# Patient Record
Sex: FEMALE | Race: WHITE | HISPANIC OR LATINO | URBAN - METROPOLITAN AREA
[De-identification: names, ages, dates, MRNs, and addresses within clinical notes are randomized per-mention and may not be internally consistent; named-entity substitution may affect disease eponyms.]

---

## 2021-01-22 ENCOUNTER — EMERGENCY (EMERGENCY)
Facility: HOSPITAL | Age: 26
LOS: 1 days | Discharge: HOME | End: 2021-01-22
Attending: EMERGENCY MEDICINE | Admitting: EMERGENCY MEDICINE
Payer: COMMERCIAL

## 2021-01-22 VITALS
RESPIRATION RATE: 16 BRPM | DIASTOLIC BLOOD PRESSURE: 76 MMHG | TEMPERATURE: 98 F | HEART RATE: 87 BPM | SYSTOLIC BLOOD PRESSURE: 111 MMHG | OXYGEN SATURATION: 99 %

## 2021-01-22 DIAGNOSIS — Z20.822 CONTACT WITH AND (SUSPECTED) EXPOSURE TO COVID-19: ICD-10-CM

## 2021-01-22 PROCEDURE — 99283 EMERGENCY DEPT VISIT LOW MDM: CPT

## 2021-01-22 NOTE — ED PROVIDER NOTE - PATIENT PORTAL LINK FT
You can access the FollowMyHealth Patient Portal offered by [facility] by registering at the following website: [email or web address]. By joining [facility]’s FollowMyHealth portal, you will also be able to view your health information using other applications (apps) compatible with our system.

## 2021-01-22 NOTE — ED PROVIDER NOTE - OBJECTIVE STATEMENT
The patient is presenting to the ED requesting COVID-19 screening. The patient is currently asymptomatic and has no other medical complaints at this time. Denies headache,  fever, chills, chest pain, SOB, cough, body aches, nausea/vomiting/diarrhea, or change to sense of smell or taste.  The patient denies any recent travel or known exposure. The patient is presenting to the ED requesting COVID-19 screening. The patient is currently asymptomatic and has no other medical complaints at this time. Denies headache,  fever, chills, chest pain, SOB, cough, body aches, nausea/vomiting/diarrhea, or change to sense of smell or taste.  The patient denies any known exposure, but she recently returned from [place] on Monday.  She had a rapid COVID test the day before she left.  She thinks she had COVID-19 in March of 2019.  Had positive antibody titer in November. The patient is presenting to the ED requesting COVID-19 screening. The patient is currently asymptomatic and has no other medical complaints at this time. Denies headache,  fever, chills, chest pain, SOB, cough, body aches, nausea/vomiting/diarrhea, or change to sense of smell or taste.  The patient denies any known exposure, but she recently returned from [place] on Monday.  She had a rapid COVID test the day before she left for [place].  She thinks she had COVID-19 in March of 2019.  Had positive antibody titer in November.  She would like antibody testing today, in addition to the PCR test.

## 2021-01-22 NOTE — ED PROVIDER NOTE - NSFOLLOWUPINSTRUCTIONS_ED_ALL_ED_FT
THE COVID 19 TEST RESULTS  - results may take up to 2-3 days to become available   - if you have consented, you will receive your results electronically   -  you can check [facility] or call [phone] to discuss your results with our nursing staff    Please continue to self quarantine (home isolation) until your result is back and follow instructions accordingly  - positive: complete home isolation for a total of 14 days since day of testing and no more fever with feeling back to baseline   - negative: you will be able to stop home isolation but still practice standard precautions, similar to how you would manage a regular flu/cold.    Return to ER for any worsening symptoms, such as persistent fever >100.4F, shortness of breath, coughing up bloody sputum, chest pain, lethargy, and fainting    Please remember to wash your hands frequently (>20 seconds each time), avoid touching your face, and cover your cough/sneeze.  Always wear a mask when you are outside of your home and practice social distancing.    Only take Tylenol for fever/pain control and avoid NSAIDs (ibuprofen/Advil/Aleve/naproxen) due to potential increased risk of exacerbating COVID-19 infection

## 2021-01-22 NOTE — ED ADULT TRIAGE NOTE - INTERNATIONAL TRAVEL
"VS: BP (!) 165/86   Pulse 80   Temp 98.2  F (36.8  C)   Resp 16   Ht 1.651 m (5' 5\")   Wt 109.4 kg (241 lb 2.9 oz)   SpO2 96%   BMI 40.13 kg/m      Current condition: Hypertensive, otherwise stable on RA  Neuro: WDL  Cardio: WDL  Respiratory: WDL  GI/: Voids spontaneously, no BM during shift.   Skin: WDL  Diet:   NPO since 0000.   LDA:  RPIV saline locked.   Mobility:  UP with assist of one.   Pain: Denies.   PRN medications: None given.    Plan of Care: Will continue to monitor and assess for any changes.   " No

## 2021-01-22 NOTE — ED PROVIDER NOTE - CLINICAL SUMMARY MEDICAL DECISION MAKING FREE TEXT BOX
Asymptomatic patient here for COVID screening. Risk of exposure is very low. Reviewed vitals and testing plan with the patient. Encouraged to download the [facility] for test results. Asymptomatic patient here for COVID screening. Risk of exposure is moderate, due to recent travel. Reviewed vitals and testing plan with the patient. Encouraged to download the [facility] for test results.

## 2021-01-23 LAB
SARS-COV-2 IGG SERPL QL IA: POSITIVE
SARS-COV-2 IGM SERPL IA-ACNC: 2.62 INDEX — HIGH
SARS-COV-2 RNA SPEC QL NAA+PROBE: SIGNIFICANT CHANGE UP